# Patient Record
Sex: MALE | Race: WHITE
[De-identification: names, ages, dates, MRNs, and addresses within clinical notes are randomized per-mention and may not be internally consistent; named-entity substitution may affect disease eponyms.]

---

## 2018-02-09 ENCOUNTER — HOSPITAL ENCOUNTER (EMERGENCY)
Dept: HOSPITAL 76 - ED | Age: 38
Discharge: HOME | End: 2018-02-09
Payer: COMMERCIAL

## 2018-02-09 VITALS — SYSTOLIC BLOOD PRESSURE: 138 MMHG | DIASTOLIC BLOOD PRESSURE: 91 MMHG

## 2018-02-09 DIAGNOSIS — H57.8: Primary | ICD-10-CM

## 2018-02-09 PROCEDURE — 99282 EMERGENCY DEPT VISIT SF MDM: CPT

## 2018-02-09 PROCEDURE — 99283 EMERGENCY DEPT VISIT LOW MDM: CPT

## 2018-02-09 NOTE — ED PHYSICIAN DOCUMENTATION
PD HPI OPHTHO





- Stated complaint


Stated Complaint: EYE IRRITATION





- Chief complaint


Chief Complaint: Heent





- History obtained from


History obtained from: Patient





- History of Present Illness


Timing - onset: How many hours ago (1)


Timing - details: Still present


Associated symptoms: FB sensation


Contributing factors: Wears contacts


Similar symptoms before: Has not had sx before





- Additional information


Additional information: 


The patient is a 38-year-old male who wears contact lenses, and presents with 

foreign body sensation in his right eye.  The sensation started this morning 

after putting in his contact lens.  He subsequently removed the contact lens 

and flushed his eye, without relief.  He denies any change in his visual 

acuity.  The contact lens in his left eye was not associated with similar 

symptoms.  He denies history of similar symptoms in the past.








Review of Systems


Eyes: reports: Irritation.  denies: Decreased vision, Photophobia, Discharge


Nose: denies: Congestion


GI: denies: Nausea


Neurologic: denies: Headache





PD PAST MEDICAL HISTORY





- Past Medical History


Cardiovascular: None


Respiratory: None


Neuro: None


Endocrine/Autoimmune: None





- Present Medications


Home Medications: 


 Ambulatory Orders











 Medication  Instructions  Recorded  Confirmed


 


No Known Home Medications [No  02/09/18 02/09/18





Known Home Medications]   














- Allergies


Allergies/Adverse Reactions: 


 Allergies











Allergy/AdvReac Type Severity Reaction Status Date / Time


 


No Known Drug Allergies Allergy   Verified 02/09/18 08:14














- Immunizations


Immunizations are current?: Yes





PD ED PE NORMAL





- Vitals


Vital signs reviewed: Yes (Mild hypertension initially.)





- General


General: Alert and oriented X 3, Well developed/nourished





- HEENT


HEENT: Atraumatic, PERRL, EOMI, Other (Fluorescein stain and slit-lamp exam of 

the right eye reveals no corneal abrasion or foreign body.  He upper lid was 

everted and no foreign body was detected.  Funduscopic exam reveals sharp disc 

margins, without papilledema.)





- Neck


Neck: No adenopathy





- Respiratory


Respiratory: No respiratory distress





PD ED PE EXPANDED





- Eyes


Eyes: Visual acuity - see nn (20/20 in the left eye with contact lens, 20/40 in 

the right eye without contact lens.), PERRL, Normal accommodation, EOMI, Right 

eye, No eyelid FB (everted), Nl conjunctiva/sclera, Anterior chambers clear, 

Normal fundi.  No: Conj/sclera FB, Corneal FB, Corneal abrasion, Fluorescein 

uptake





Results





- Vitals


Vitals: 


 Oxygen











O2 Source                      Room air

















PD MEDICAL DECISION MAKING





- ED course


Complexity details: considered differential, d/w patient


ED course: 


The patient's presentation is significant for irritation of the right eye, with 

foreign body sensation.  Fluorescein stain and slit-lamp exam including 

eversion of the upper eyelid, reveals no evidence of foreign body or corneal 

abrasion.  The foreign body sensation resolved completely with installation of 

Ophthaine.  It is possible that an eyelash inverted, and created a foreign body 

sensation.  However that was not seen on examination.


I discussed with the patient potentially worrisome signs or symptoms that 

should prompt reevaluation in the emergency department.








Departure





- Departure


Disposition: 01 Home, Self Care


Clinical Impression: 


 Irritation of right eye





Condition: Stable


Instructions:  ED Conjunctivitis Nonspecific


Follow-Up: 


REHAN JOHNSON DO [Primary Care Provider] - 


Comments: 


You can flush your eye with artificial tears as needed.


Avoid wearing your contact lens until the irritation of your eye has resolved.


Follow up with your primary physician or return to the emergency department if 

increasing irritation of your eye, or if not improving within 24-48 hours.


Discharge Date/Time: 02/09/18 09:57